# Patient Record
Sex: MALE | Race: WHITE | NOT HISPANIC OR LATINO
[De-identification: names, ages, dates, MRNs, and addresses within clinical notes are randomized per-mention and may not be internally consistent; named-entity substitution may affect disease eponyms.]

---

## 2022-07-19 ENCOUNTER — APPOINTMENT (OUTPATIENT)
Dept: ORTHOPEDIC SURGERY | Facility: CLINIC | Age: 57
End: 2022-07-19

## 2022-07-19 VITALS — WEIGHT: 190 LBS | HEIGHT: 67 IN | BODY MASS INDEX: 29.82 KG/M2

## 2022-07-19 DIAGNOSIS — Z78.9 OTHER SPECIFIED HEALTH STATUS: ICD-10-CM

## 2022-07-19 PROCEDURE — 73130 X-RAY EXAM OF HAND: CPT | Mod: LT

## 2022-07-19 PROCEDURE — 99214 OFFICE O/P EST MOD 30 MIN: CPT

## 2022-07-19 NOTE — ASSESSMENT
[FreeTextEntry1] : The patient was advised of the diagnosis. The natural history of the pathology was explained in full to the patient in layman's terms. All questions were answered. The risks and benefits of surgical and non-surgical treatment alternatives were explained in full to the patient.\par \par Pt will have left hand mass excision to remove foreign body and/or granuloma.\par \par Continue with antibiotics until surgery.

## 2022-07-19 NOTE — IMAGING
[de-identified] : swelling and erythema of MF MP\par Minimal TTP over MF MP\par FAROM\par NVID  [Left] : left hand [There are no fractures, subluxations or dislocations. No significant abnormalities are seen] : There are no fractures, subluxations or dislocations. No significant abnormalities are seen [FreeTextEntry1] : No radio opaque foreign body

## 2022-07-19 NOTE — HISTORY OF PRESENT ILLNESS
[Sudden] : sudden [2] : 2 [0] : 0 [de-identified] : 7/19/22:  Pt was cutting down a tree and got a splinter in  MP and pulled it out 6 weeks ago.  It was showing signs of infection that has since improved a little on antibiotics. [] : Post Surgical Visit: no [FreeTextEntry1] : L hand [FreeTextEntry3] : July 2022 [FreeTextEntry5] : Patient had a splinter in his hand and thought he took it out.  [FreeTextEntry6] : mostly swelling

## 2022-08-08 ENCOUNTER — APPOINTMENT (OUTPATIENT)
Dept: ORTHOPEDIC SURGERY | Facility: CLINIC | Age: 57
End: 2022-08-08

## 2022-08-08 VITALS — HEIGHT: 67 IN | WEIGHT: 190 LBS | BODY MASS INDEX: 29.82 KG/M2

## 2022-08-08 DIAGNOSIS — S60.552A SUPERFICIAL FOREIGN BODY OF LEFT HAND, INITIAL ENCOUNTER: ICD-10-CM

## 2022-08-08 PROCEDURE — 99213 OFFICE O/P EST LOW 20 MIN: CPT

## 2022-08-08 NOTE — IMAGING
[de-identified] : Left hand with dorsal swelling over the 4th MC region with no evidence of infection.\par This region is not ttp. \par Left hand is nvi with FAROM.\par  and intrinsic strength is 5/5. \par \par

## 2022-08-08 NOTE — ASSESSMENT
Spoke with mom and she stated patient is very congested. Mom stated he is otherwise acting fine. I let mom know that she can try start with supportive care measures and do nebs every 4 hours as needed. I let mom know that she can do steam showers and suction very well after.  I let mom know to run a humidifier at night and elevate the HOB. I let mom know that if patient seems to be worsening or not getting better after the weekend to give us a call and we can have him seen    [FreeTextEntry1] : Pt symptoms have dramatically improved and there is no evidence of infection.\par Pt would like to hold off on surgery presently and will observe for now.\par He is again informed that he may express the FB with time.\par Pt will rto in 3 weeks for f/u care and he is encouraged to call with any questions/concerns.

## 2022-08-08 NOTE — REASON FOR VISIT
[FreeTextEntry2] : Patient is here for his left hand. New patient
EOAE (evoked otoacoustic emission)

## 2022-08-08 NOTE — HISTORY OF PRESENT ILLNESS
[Sudden] : sudden [2] : 2 [0] : 0 [de-identified] : 8/8/2022: Pt here for evaluation of left dorsal hand FB with secondary infection. \par Pt has completed his course of abx and he no longer has pain. \par \par 7/19/22:  Pt was cutting down a tree and got a splinter in  MP and pulled it out 6 weeks ago.  It was showing signs of infection that has since improved a little on antibiotics. [] : Post Surgical Visit: no [FreeTextEntry1] : L hand [FreeTextEntry3] : July 2022 [FreeTextEntry5] : Patient had a splinter in his hand and thought he took it out.  [FreeTextEntry6] : mostly swelling

## 2022-08-12 ENCOUNTER — APPOINTMENT (OUTPATIENT)
Age: 57
End: 2022-08-12

## 2022-09-01 ENCOUNTER — APPOINTMENT (OUTPATIENT)
Dept: ORTHOPEDIC SURGERY | Facility: CLINIC | Age: 57
End: 2022-09-01

## 2022-12-01 ENCOUNTER — APPOINTMENT (OUTPATIENT)
Dept: ORTHOPEDIC SURGERY | Facility: CLINIC | Age: 57
End: 2022-12-01

## 2022-12-01 VITALS — BODY MASS INDEX: 29.82 KG/M2 | WEIGHT: 190 LBS | HEIGHT: 67 IN

## 2022-12-01 DIAGNOSIS — Z78.9 OTHER SPECIFIED HEALTH STATUS: ICD-10-CM

## 2022-12-01 DIAGNOSIS — Z00.00 ENCOUNTER FOR GENERAL ADULT MEDICAL EXAMINATION W/OUT ABNORMAL FINDINGS: ICD-10-CM

## 2022-12-01 PROCEDURE — 99213 OFFICE O/P EST LOW 20 MIN: CPT

## 2022-12-01 PROCEDURE — 73080 X-RAY EXAM OF ELBOW: CPT | Mod: RT

## 2022-12-01 NOTE — HISTORY OF PRESENT ILLNESS
[Gradual] : gradual [7] : 7 [2] : 2 [Dull/Aching] : dull/aching [Sharp] : sharp [Frequent] : frequent [Leisure] : leisure [Rest] : rest [Exercising] : exercising [de-identified] : 12/1/22:  right medial sided elbow pain x 2 mos. \par RHD, works- construction  [] : Post Surgical Visit: no [FreeTextEntry1] : right elbow

## 2022-12-01 NOTE — IMAGING
[de-identified] : full active range of motion of the right shoulder, wrist and fingers\par full active range of motion of the right elbow\par Tenderness to palpation of the medial epicondyle and proximal flexor pronator\par pain with resisted wrist flexion and forearm pronation\par 4/5 strength in pronation, otherwise 5/5 strength\par median/ulnar/radial sensation intact to light touch\par ain/pin/ulnar motor intact\par palpable pulses CR<2s\par  [Right] : right elbow [There are no fractures, subluxations or dislocations. No significant abnormalities are seen] : There are no fractures, subluxations or dislocations. No significant abnormalities are seen

## 2022-12-01 NOTE — ASSESSMENT
[FreeTextEntry1] : The patient was advised of the diagnosis. The natural history of the pathology was explained in full to the patient in layman's terms. All questions were answered. The risks and benefits of surgical and non-surgical treatment alternatives were explained in full to the patient.\par We discussed treatment options including nsaids, topical gels, tennis elbow strap, PT, activity modification, cortisone inj, sx .pt is aware that symptoms usually resolve on their own in 95-99% of people, but the timeframe is unknown.\par Home exercises/stretches were demonstrated and the patient practiced them as well- They are to do the exercises hourly and hold the stretch for 30 seconds. \par Avoid repetitive wrist flexion\par time was spent instructing the patient on appropriate placement of the elbow strap as well. \par \par Pt will try HEP, PT, and activity modification.

## 2023-01-12 ENCOUNTER — APPOINTMENT (OUTPATIENT)
Dept: ORTHOPEDIC SURGERY | Facility: CLINIC | Age: 58
End: 2023-01-12
Payer: COMMERCIAL

## 2023-01-12 VITALS — HEIGHT: 67 IN | WEIGHT: 190 LBS | BODY MASS INDEX: 29.82 KG/M2

## 2023-01-12 DIAGNOSIS — M77.01 MEDIAL EPICONDYLITIS, RIGHT ELBOW: ICD-10-CM

## 2023-01-12 PROCEDURE — 99213 OFFICE O/P EST LOW 20 MIN: CPT

## 2023-01-12 NOTE — ASSESSMENT
[FreeTextEntry1] : The patient was advised of the diagnosis. The natural history of the pathology was explained in full to the patient in layman's terms. All questions were answered. The risks and benefits of surgical and non-surgical treatment alternatives were explained in full to the patient.\par We discussed treatment options including nsaids, topical gels, tennis elbow strap, PT, activity modification, cortisone inj, sx .pt is aware that symptoms usually resolve on their own in 95-99% of people, but the timeframe is unknown.\par Home exercises/stretches were demonstrated and the patient practiced them as well- They are to do the exercises hourly and hold the stretch for 30 seconds. \par Avoid repetitive wrist flexion\par time was spent instructing the patient on appropriate placement of the elbow strap as well. \par \par Continue therapy

## 2023-01-12 NOTE — HISTORY OF PRESENT ILLNESS
[de-identified] : 1/12/23:  Pt has been doing therapy and is improving.  Pt has not been compliant with stretching.\par \par 12/1/22:  right medial sided elbow pain x 2 mos. 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

## 2023-01-12 NOTE — IMAGING
[Right] : right elbow [There are no fractures, subluxations or dislocations. No significant abnormalities are seen] : There are no fractures, subluxations or dislocations. No significant abnormalities are seen [de-identified] : full active range of motion of the right shoulder, wrist and fingers\par full active range of motion of the right elbow\par Tenderness to palpation of the medial epicondyle and proximal flexor pronator\par pain with resisted wrist flexion and forearm pronation\par 4/5 strength in pronation, otherwise 5/5 strength\par median/ulnar/radial sensation intact to light touch\par ain/pin/ulnar motor intact\par palpable pulses CR<2s\par